# Patient Record
Sex: FEMALE | Race: WHITE
[De-identification: names, ages, dates, MRNs, and addresses within clinical notes are randomized per-mention and may not be internally consistent; named-entity substitution may affect disease eponyms.]

---

## 2018-05-24 ENCOUNTER — HOSPITAL ENCOUNTER (EMERGENCY)
Dept: HOSPITAL 12 - ER | Age: 58
Discharge: HOME | End: 2018-05-24
Payer: COMMERCIAL

## 2018-05-24 VITALS — WEIGHT: 140 LBS | BODY MASS INDEX: 23.32 KG/M2 | HEIGHT: 65 IN

## 2018-05-24 VITALS — SYSTOLIC BLOOD PRESSURE: 147 MMHG | DIASTOLIC BLOOD PRESSURE: 72 MMHG

## 2018-05-24 DIAGNOSIS — R51: Primary | ICD-10-CM

## 2018-05-24 PROCEDURE — A4663 DIALYSIS BLOOD PRESSURE CUFF: HCPCS

## 2018-05-24 NOTE — NUR
PT AMBULATED TO ER WITH C/O HEADACHE THAT STARTED 7 DAYS AGO. AAOX4. ABLE TO 
SPEAK IN COMPLETE SENTENCES. RESPONSIVE TO VERBAL + TACTILE STIMUIL. 
RESPIRATIONS EVEN + UNLABORED.  PER PT, SHE HAS NOT TAKEN ANY MEDICATION TO 
RELIEVE HEADACHE.  FAMILY AT BEDSIDE.

## 2018-05-24 NOTE — NUR
Patient discharged to home in stable conditon.  Written and verbal after care 
instructions given. 

Patient verbalizes understanding of instructions.  pt ambulated out of ER in 
steady gait.  Accompanied by family.  VSS.

## 2019-12-09 ENCOUNTER — HOSPITAL ENCOUNTER (EMERGENCY)
Dept: HOSPITAL 12 - ER | Age: 59
Discharge: HOME | End: 2019-12-09
Payer: COMMERCIAL

## 2019-12-09 VITALS — DIASTOLIC BLOOD PRESSURE: 74 MMHG | SYSTOLIC BLOOD PRESSURE: 116 MMHG

## 2019-12-09 VITALS — BODY MASS INDEX: 23.39 KG/M2 | WEIGHT: 137 LBS | HEIGHT: 64 IN

## 2019-12-09 DIAGNOSIS — R04.0: Primary | ICD-10-CM

## 2019-12-09 DIAGNOSIS — Z88.5: ICD-10-CM

## 2019-12-09 PROCEDURE — A4663 DIALYSIS BLOOD PRESSURE CUFF: HCPCS

## 2019-12-09 NOTE — NUR
Pt verbally discharged by Dr. Swan. Patient discharged to home in stable 
conditon. Pt given discharge instructions verbally. Patient verbalizes 
understanding of instructions. Pt out of ER with steady gait, no acute signs of 
distress, VSS, all belongings taken.